# Patient Record
Sex: MALE | Race: WHITE | URBAN - METROPOLITAN AREA
[De-identification: names, ages, dates, MRNs, and addresses within clinical notes are randomized per-mention and may not be internally consistent; named-entity substitution may affect disease eponyms.]

---

## 2018-03-21 ENCOUNTER — OFFICE VISIT (OUTPATIENT)
Dept: FAMILY MEDICINE | Facility: CLINIC | Age: 33
End: 2018-03-21
Payer: COMMERCIAL

## 2018-03-21 VITALS
HEIGHT: 72 IN | BODY MASS INDEX: 25.33 KG/M2 | TEMPERATURE: 99.9 F | OXYGEN SATURATION: 99 % | DIASTOLIC BLOOD PRESSURE: 59 MMHG | HEART RATE: 85 BPM | WEIGHT: 187 LBS | SYSTOLIC BLOOD PRESSURE: 102 MMHG

## 2018-03-21 DIAGNOSIS — J03.90 TONSILLITIS: ICD-10-CM

## 2018-03-21 DIAGNOSIS — R50.9 FEVER IN ADULT: Primary | ICD-10-CM

## 2018-03-21 LAB
FLUAV+FLUBV AG SPEC QL: NEGATIVE
FLUAV+FLUBV AG SPEC QL: NEGATIVE
SPECIMEN SOURCE: NORMAL

## 2018-03-21 PROCEDURE — 87804 INFLUENZA ASSAY W/OPTIC: CPT | Mod: 59 | Performed by: FAMILY MEDICINE

## 2018-03-21 PROCEDURE — 99203 OFFICE O/P NEW LOW 30 MIN: CPT | Performed by: FAMILY MEDICINE

## 2018-03-21 RX ORDER — PREDNISONE 20 MG/1
40 TABLET ORAL DAILY
Qty: 10 TABLET | Refills: 0 | Status: SHIPPED | OUTPATIENT
Start: 2018-03-21 | End: 2018-03-26

## 2018-03-21 RX ORDER — PENICILLIN V POTASSIUM 500 MG/1
500 TABLET, FILM COATED ORAL 2 TIMES DAILY
Qty: 20 TABLET | Refills: 0 | Status: SHIPPED | OUTPATIENT
Start: 2018-03-21 | End: 2018-03-31

## 2018-03-21 NOTE — PATIENT INSTRUCTIONS
Prednisone 40 mg daily for 5 days  Penicillin twice daily for 10 days  Tylenol 500 mg every 8 hours   Follow up within the next 1-2 days if you are experiencing worsening fever, sore throat or difficulty breathing

## 2018-03-21 NOTE — MR AVS SNAPSHOT
"              After Visit Summary   3/21/2018    Carlin Springer    MRN: [id number]           Patient Information     Date Of Birth          1985        Visit Information        Provider Department      3/21/2018 1:20 PM Chuck Casillas MD [facility]        Today's Diagnoses     Fever in adult    -  1      Care Instructions    Prednisone 40 mg daily for 5 days  Penicillin twice daily for 10 days  Tylenol 500 mg every 8 hours   Follow up within the next 1-2 days if you are experiencing worsening fever, sore throat or difficulty breathing           Follow-ups after your visit        Who to contact     If you have questions or need follow up information about today's clinic visit or your schedule please contact [facility] directly at [phone].  Normal or non-critical lab and imaging results will be communicated to you by MyChart, letter or phone within 4 business days after the clinic has received the results. If you do not hear from us within 7 days, please contact the clinic through MyChart or phone. If you have a critical or abnormal lab result, we will notify you by phone as soon as possible.  Submit refill requests through [employer] or call your pharmacy and they will forward the refill request to us. Please allow 3 business days for your refill to be completed.          Additional Information About Your Visit        MyChart Information     [employer] lets you send messages to your doctor, view your test results, renew your prescriptions, schedule appointments and more. To sign up, go to [email or web address] . Click on \"Log in\" on the left side of the screen, which will take you to the Welcome page. Then click on \"Sign up Now\" on the right side of the page.     You will be asked to enter the access code listed below, as well as some personal information. Please follow the directions to create your username and password.     Your access code is: [email or web address]  Expires: " 2018  2:08 PM     Your access code will  in 90 days. If you need help or a new code, please call your [facility] clinic or [phone].        Care EveryWhere ID     This is your Care EveryWhere ID. This could be used by other organizations to access your [facility] medical records  [id number]        Your Vitals Were     Pulse Temperature Height Pulse Oximetry BMI (Body Mass Index)       85 99.9  F (37.7  C) (Tympanic) 6' (1.829 m) 99% 25.36 kg/m2        Blood Pressure from Last 3 Encounters:   18 102/59    Weight from Last 3 Encounters:   18 187 lb (84.8 kg)              We Performed the Following     Influenza A/B antigen        Primary Care Provider Fax #    Physician No Ref-Primary [phone]       No address on file        Equal Access to Services     JUNI RECINOS : Hadii amaury Toro, waaxakira rosaadaha, qaybta kaalmada lupe, clive amaral . So [facility] [phone].    ATENCIÓN: Si habla español, tiene a casillas disposición servicios gratuitos de asistencia lingüística. Lisa al [phone].    We comply with applicable federal civil rights laws and [place] laws. We do not discriminate on the basis of race, color, national origin, age, disability, sex, sexual orientation, or gender identity.            Thank you!     Thank you for choosing [facility]  for your care. Our goal is always to provide you with excellent care. Hearing back from our patients is one way we can continue to improve our services. Please take a few minutes to complete the written survey that you may receive in the mail after your visit with us. Thank you!             Your Updated Medication List - Protect others around you: Learn how to safely use, store and throw away your medicines at www.disposemymeds.org.          This list is accurate as of 3/21/18  2:08 PM.  Always use your most recent med list.                   Brand Name Dispense Instructions for use  Diagnosis    IBUPROFEN PO

## 2018-03-21 NOTE — PROGRESS NOTES
SUBJECTIVE:   Carlin Springer is a 33 year old male who presents to clinic today for the following health issues:      RESPIRATORY SYMPTOMS      Duration:03/19/2018    Description  Sore throat, fever, headache, mild right ear pain, dry cough, PND    Accompanying signs and symptoms: see above     History (predisposing factors):  none    Precipitating or alleviating factors: None    Therapies tried and outcome: cepacol and ibuprofen       No nasal drainage, rhinorrhea, facial pressure,   After taking Ibuprofen, he notices some mild chest pain. Chest pain is correlated to Ibuprofen use. This is first time pt is taking Ibuprofen.   No smoking. No sick contacts.   Last week pt travelled to [place] and [place].   Yesterday he was seen at [facility], negative strep.     Problem list and histories reviewed & adjusted, as indicated.  Additional history: as documented    Labs reviewed in EPIC    Reviewed and updated as needed this visit by clinical staff  Allergies       Reviewed and updated as needed this visit by Provider      Reviewed PMH, PSH, FH, Medication and Allergies.     ROS:  Constitutional, HEENT, cardiovascular, pulmonary, GI, , musculoskeletal, neuro, skin, endocrine and psych systems are negative, except as otherwise noted.    OBJECTIVE:     /59  Pulse 85  Temp 99.9  F (37.7  C) (Tympanic)  Ht 6' (1.829 m)  Wt 187 lb (84.8 kg)  SpO2 99%  BMI 25.36 kg/m2  Body mass index is 25.36 kg/(m^2).  GENERAL: healthy, alert and no distress  EYES: Eyes grossly normal to inspection  HENT: ear canals and TM's normal, + bilateral enlarged tonsils, midline uvula, no abscess seen   NECK: + bilateral cervical adenopathy  RESP: lungs clear to auscultation - no rales, rhonchi or wheezes  CV: regular rate and rhythm, normal S1 S2  MS: no gross musculoskeletal defects noted, no edema    Diagnostic Test Results:  Results for orders placed or performed in visit on 03/21/18   Influenza A/B antigen   Result Value Ref  Range    Influenza A/B Agn Specimen Nasopharyngeal     Influenza A Negative NEG^Negative    Influenza B Negative NEG^Negative       ASSESSMENT/PLAN:       1. Fever in adult  - Influenza A/B antigen    2. Tonsillitis  - discussed symptomatic tx and prednisone s/e   - predniSONE (DELTASONE) 20 MG tablet; Take 2 tablets (40 mg) by mouth daily for 5 days  Dispense: 10 tablet; Refill: 0  - penicillin V potassium (VEETID) 500 MG tablet; Take 1 tablet (500 mg) by mouth 2 times daily for 10 days  Dispense: 20 tablet; Refill: 0  - follow up within the next 2 days if symptoms are not improving       Chuck Casillas MD  [facility]

## 2019-03-14 ENCOUNTER — OFFICE VISIT (OUTPATIENT)
Dept: FAMILY MEDICINE | Facility: CLINIC | Age: 34
End: 2019-03-14
Payer: COMMERCIAL

## 2019-03-14 VITALS
TEMPERATURE: 98.1 F | BODY MASS INDEX: 24.78 KG/M2 | OXYGEN SATURATION: 97 % | RESPIRATION RATE: 25 BRPM | HEIGHT: 71 IN | SYSTOLIC BLOOD PRESSURE: 131 MMHG | WEIGHT: 177 LBS | HEART RATE: 57 BPM | DIASTOLIC BLOOD PRESSURE: 73 MMHG

## 2019-03-14 DIAGNOSIS — R06.83 SNORING: ICD-10-CM

## 2019-03-14 DIAGNOSIS — Z11.4 ENCOUNTER FOR SCREENING FOR HIV: ICD-10-CM

## 2019-03-14 DIAGNOSIS — Z23 NEED FOR DTAP VACCINE: ICD-10-CM

## 2019-03-14 DIAGNOSIS — Z00.00 ROUTINE GENERAL MEDICAL EXAMINATION AT A HEALTH CARE FACILITY: Primary | ICD-10-CM

## 2019-03-14 LAB
BASOPHILS # BLD AUTO: 0 10E9/L (ref 0–0.2)
BASOPHILS NFR BLD AUTO: 0.3 %
DIFFERENTIAL METHOD BLD: NORMAL
EOSINOPHIL # BLD AUTO: 0.2 10E9/L (ref 0–0.7)
EOSINOPHIL NFR BLD AUTO: 2.9 %
ERYTHROCYTE [DISTWIDTH] IN BLOOD BY AUTOMATED COUNT: 13.2 % (ref 10–15)
HCT VFR BLD AUTO: 44.3 % (ref 40–53)
HGB BLD-MCNC: 15.2 G/DL (ref 13.3–17.7)
LYMPHOCYTES # BLD AUTO: 2.5 10E9/L (ref 0.8–5.3)
LYMPHOCYTES NFR BLD AUTO: 40.7 %
MCH RBC QN AUTO: 31 PG (ref 26.5–33)
MCHC RBC AUTO-ENTMCNC: 34.3 G/DL (ref 31.5–36.5)
MCV RBC AUTO: 90 FL (ref 78–100)
MONOCYTES # BLD AUTO: 0.8 10E9/L (ref 0–1.3)
MONOCYTES NFR BLD AUTO: 13.7 %
NEUTROPHILS # BLD AUTO: 2.6 10E9/L (ref 1.6–8.3)
NEUTROPHILS NFR BLD AUTO: 42.4 %
PLATELET # BLD AUTO: 268 10E9/L (ref 150–450)
RBC # BLD AUTO: 4.91 10E12/L (ref 4.4–5.9)
WBC # BLD AUTO: 6.1 10E9/L (ref 4–11)

## 2019-03-14 PROCEDURE — 90471 IMMUNIZATION ADMIN: CPT | Performed by: FAMILY MEDICINE

## 2019-03-14 PROCEDURE — 36415 COLL VENOUS BLD VENIPUNCTURE: CPT | Performed by: FAMILY MEDICINE

## 2019-03-14 PROCEDURE — 99213 OFFICE O/P EST LOW 20 MIN: CPT | Mod: 25 | Performed by: FAMILY MEDICINE

## 2019-03-14 PROCEDURE — 90715 TDAP VACCINE 7 YRS/> IM: CPT | Performed by: FAMILY MEDICINE

## 2019-03-14 PROCEDURE — 84443 ASSAY THYROID STIM HORMONE: CPT | Performed by: FAMILY MEDICINE

## 2019-03-14 PROCEDURE — 99395 PREV VISIT EST AGE 18-39: CPT | Mod: 25 | Performed by: FAMILY MEDICINE

## 2019-03-14 PROCEDURE — 82947 ASSAY GLUCOSE BLOOD QUANT: CPT | Performed by: FAMILY MEDICINE

## 2019-03-14 PROCEDURE — 80061 LIPID PANEL: CPT | Performed by: FAMILY MEDICINE

## 2019-03-14 PROCEDURE — 85025 COMPLETE CBC W/AUTO DIFF WBC: CPT | Performed by: FAMILY MEDICINE

## 2019-03-14 PROCEDURE — 87389 HIV-1 AG W/HIV-1&-2 AB AG IA: CPT | Performed by: FAMILY MEDICINE

## 2019-03-14 ASSESSMENT — ENCOUNTER SYMPTOMS
MYALGIAS: 0
ABDOMINAL PAIN: 0
DIZZINESS: 0
CHILLS: 0
DYSURIA: 0
NERVOUS/ANXIOUS: 0
EYE PAIN: 0
ARTHRALGIAS: 0
SHORTNESS OF BREATH: 0
NAUSEA: 0
HEMATOCHEZIA: 0
PARESTHESIAS: 0
SORE THROAT: 0
FEVER: 0
HEMATURIA: 0
CONSTIPATION: 0
PALPITATIONS: 0
HEADACHES: 0
JOINT SWELLING: 0
HEARTBURN: 0
COUGH: 0
FREQUENCY: 0
DIARRHEA: 0

## 2019-03-14 ASSESSMENT — MIFFLIN-ST. JEOR: SCORE: 1752.06

## 2019-03-14 ASSESSMENT — PATIENT HEALTH QUESTIONNAIRE - PHQ9
SUM OF ALL RESPONSES TO PHQ QUESTIONS 1-9: 24
10. IF YOU CHECKED OFF ANY PROBLEMS, HOW DIFFICULT HAVE THESE PROBLEMS MADE IT FOR YOU TO DO YOUR WORK, TAKE CARE OF THINGS AT HOME, OR GET ALONG WITH OTHER PEOPLE: NOT DIFFICULT AT ALL
SUM OF ALL RESPONSES TO PHQ QUESTIONS 1-9: 24

## 2019-03-14 NOTE — PATIENT INSTRUCTIONS
Preventive Health Recommendations  Male Ages 26 - 39    Yearly exam:             See your health care provider every year in order to  o   Review health changes.   o   Discuss preventive care.    o   Review your medicines if your doctor has prescribed any.    You should be tested each year for STDs (sexually transmitted diseases), if you re at risk.     After age 35, talk to your provider about cholesterol testing. If you are at risk for heart disease, have your cholesterol tested at least every 5 years.     If you are at risk for diabetes, you should have a diabetes test (fasting glucose).  Shots: Get a flu shot each year. Get a tetanus shot every 10 years.     Nutrition:    Eat at least 5 servings of fruits and vegetables daily.     Eat whole-grain bread, whole-wheat pasta and brown rice instead of white grains and rice.     Get adequate Calcium and Vitamin D.     Lifestyle    Exercise for at least 150 minutes a week (30 minutes a day, 5 days a week). This will help you control your weight and prevent disease.     Limit alcohol to one drink per day.     No smoking.     Wear sunscreen to prevent skin cancer.     See your dentist every six months for an exam and cleaning.     Patient Education     Tips to Help Prevent Snoring    Your snoring may get better if you make a few simple changes in your sleeping and waking habits. These changes might be all you need to improve or even cure your snoring, or they may work best when used along with other types of treatment.  Sleep on your side  Sleeping on your side may keep throat tissue from blocking your air passage. This may improve or even cure snoring. But it can be hard to stop sleeping on your back. Try sewing a pocket or sock onto the back of a T-shirt or pajama top. Put a few tennis balls or a bag of unshelled nuts into this pocket or sock, then wear the shirt to bed. This will help keep you from rolling onto your back. If this doesn't work, try wearing a backpack  full of foam pieces. Or put a wedge-shaped pillow behind you. You can also buy devices online that will help you stay off your back during sleep.  Don't use alcohol or certain medicines  Alcohol and medicines such as sedatives and sleeping pills can make breathing slower and more shallow. They also make your muscles relax, so structures in your throat can block your air passage. These changes can cause or worsen snoring. If you snore, don t use alcohol. Talk with your healthcare provider if you take medicines to help you sleep.  Lose weight  Too much weight can make snoring worse. Extra weight puts pressure on your neck tissues and lungs, making breathing harder. If you're overweight, ask your healthcare provider about a weight-loss program.  Exercise regularly  Exercise can help you lose weight, tone your muscles, and make your lungs work better. These changes may help improve your snoring. Ask your healthcare provider about an exercise program like walking, or something else that you enjoy.  Unblock your nose  If something blocks your nose, treating the problem may help improve snoring. Your healthcare provider can suggest medicines for allergies or sinus problems. Nasal saline rinses can also open up the nasal passages. Nasal strips applied on the bridge of the nose can aid breathing. Surgery can straighten a deviated septum. Surgery can also reduce the size of the ridges inside the nose (turbinates) or remove growths (polyps). If you smoke, try to quit. Smoking makes a stuffy nose worse.  Understanding the risks of sleep apnea  In some cases, snoring is not physically harmful. But it can be linked to a more serious condition called sleep apnea. Some common symptoms of sleep apnea include:    Loud, frequent snoring    Heavy daytime drowsiness    Trouble breathing during sleep    Headaches when you wake up  If you are concerned that you might have sleep apnea, talk with your healthcare provider about your  symptoms. Ask about tests and treatments that may help.   Date Last Reviewed: 8/1/2017 2000-2018 [employer]. [address] [place] [address], [place] [address]. All rights reserved. This information is not intended as a substitute for professional medical care. Always follow your healthcare professional's instructions.

## 2019-03-14 NOTE — LETTER
March 28, 2019      Carlin Springer  [address]   [place] [address]        Dear ,    We are writing to inform you of your test results.    Hello!  It was a pleasure to see you in clinic!  Thank you for getting labs done. Everything looks normal, which is good news.     Your hiv test was negative for infection, you do NOT have this disease.     Your cbc, or complete blood count, which measures red blood cells (to check for anemia and other vitamin deficiencies) and white blood cells (to check for infection and leukemia) is normal, which is great!  Your platelets, which reflect liver function and ability to clot, are normal as well.     Congratulations, your cholesterol levels are all great!  Keep up the good work!  I recommend rechecking 5 years or so       Your glucose was normal, which is great, you do not have diabetes.     Your cbc, or complete blood count, which measures red blood cells (to check for anemia and other vitamin deficiencies) and white blood cells (to check for infection and leukemia) is normal, which is great!  Your platelets, which reflect liver function and ability to clot, are normal as well.     Your thyroid function is normal, which is good news.  This means that you do not have hyperthyroidism or hypothyroidism.     If you have any questions, please contact the clinic or schedule an appointment with me, thank you!     Resulted Orders   TSH with free T4 reflex   Result Value Ref Range    TSH 1.28 0.40 - 4.00 mU/L   CBC with platelets differential   Result Value Ref Range    WBC 6.1 4.0 - 11.0 10e9/L    RBC Count 4.91 4.4 - 5.9 10e12/L    Hemoglobin 15.2 13.3 - 17.7 g/dL    Hematocrit 44.3 40.0 - 53.0 %    MCV 90 78 - 100 fl    MCH 31.0 26.5 - 33.0 pg    MCHC 34.3 31.5 - 36.5 g/dL    RDW 13.2 10.0 - 15.0 %    Platelet Count 268 150 - 450 10e9/L    % Neutrophils 42.4 %    % Lymphocytes 40.7 %    % Monocytes 13.7 %    % Eosinophils 2.9 %    % Basophils 0.3 %     Absolute Neutrophil 2.6 1.6 - 8.3 10e9/L    Absolute Lymphocytes 2.5 0.8 - 5.3 10e9/L    Absolute Monocytes 0.8 0.0 - 1.3 10e9/L    Absolute Eosinophils 0.2 0.0 - 0.7 10e9/L    Absolute Basophils 0.0 0.0 - 0.2 10e9/L    Diff Method Automated Method    Lipid panel reflex to direct LDL Fasting   Result Value Ref Range    Cholesterol 177 <200 mg/dL    Triglycerides 43 <150 mg/dL      Comment:      Non Fasting    HDL Cholesterol 71 >39 mg/dL    LDL Cholesterol Calculated 97 <100 mg/dL      Comment:      Desirable:       <100 mg/dl    Non HDL Cholesterol 106 <130 mg/dL   Glucose   Result Value Ref Range    Glucose 88 70 - 99 mg/dL      Comment:      Non Fasting   HIV Antigen Antibody Combo   Result Value Ref Range    HIV Antigen Antibody Combo Nonreactive NR^Nonreactive          Comment:      HIV-1 p24 Ag & HIV-1/HIV-2 Ab Not Detected     If you have any questions or concerns, please call the clinic at the number listed above.       Sincerely,    Jacki Harrell MD/nr

## 2019-03-14 NOTE — NURSING NOTE
Screening Questionnaire for Adult Immunization     Are you sick today?   No    Do you have allergies to medications, food or any vaccine?   No    Have you ever had a serious reaction after receiving a vaccination?   No    Do you have a long-term health problem with heart disease, lung disease,  asthma, kidney disease, diabetes, anemia, metabolic or blood disease?   No    Do you have cancer, leukemia, AIDS, or any immune system problem?   No    Do you take cortisone, prednisone, other steroids, or anticancer drugs, or  have you had any x-ray (radiation) treatments?   No    Have you had a seizure, brain, or other nervous system problem?   No    During the past year, have you received a transfusion of blood or blood       products, or been given a medicine called immune (gamma) globulin?   No    For women: Are you pregnant or is there a chance you could become         pregnant during the next month?   No    Have you received any vaccinations in the past 4 weeks?   No     Immunization questionnaire answers were all negative.      MNVFC doesn't apply on this patient      Per orders of Dr. Harrell, injection of tdap given by Jah Khanna. Patient instructed to remain in clinic for 20 minutes afterwards, and to report any adverse reaction to me immediately.    Prior to injection verified patient identity using patient's name and date of birth.         Screening performed by Jah Khanna, CMA

## 2019-03-14 NOTE — PROGRESS NOTES
SUBJECTIVE:   CC: Carlin Springer is an 34 year old male who presents for preventative health visit.     Snoring  He reports many years of snoring not associated with morning headaches or daytime grogginess. He reports sleeping 6 hours a night, goes to bed at 10 and wakes at 4 am, has been doing this for year in order to get in a morning work out before work. He denies apneic episode. He reports prior hx of overbite corrected with orthodonture work. He'd like to be seen at [facility].    Physical   Annual:     Getting at least 3 servings of Calcium per day:  NO    Bi-annual eye exam:  NO    Dental care twice a year:  Yes    Sleep apnea or symptoms of sleep apnea:  Excessive snoring    Diet:  Low fat/cholesterol and Breakfast skipped    Frequency of exercise:  6-7 days/week    Duration of exercise:  Greater than 60 minutes    Taking medications regularly:  Yes    Medication side effects:  None    Additional concerns today:  No    PHQ-2 Total Score: 4      PHQ-9 score:    PHQ-9 SCORE 3/14/2019   PHQ-9 Total Score MyChart 24 (Severe depression)   PHQ-9 Total Score 0     Today's PHQ-2 Score:   PHQ-2 ( 1999 Pf[employer]) 3/14/2019   Q1: Little interest or pleasure in doing things 0   Q2: Feeling down, depressed or hopeless 0   PHQ-2 Score 0   Q1: Little interest or pleasure in doing things More than half the days   Q2: Feeling down, depressed or hopeless More than half the days   PHQ-2 Score 4       Abuse: Current or Past(Physical, Sexual or Emotional)- No  Do you feel safe in your environment? Yes    Social History     Tobacco Use     Smoking status: Never Smoker     Smokeless tobacco: Never Used   Substance Use Topics     Alcohol use: Yes     Comment: socially     Alcohol Use 3/14/2019   If you drink alcohol do you typically have greater than 3 drinks per day OR greater than 7 drinks per week? No   No flowsheet data found.    Last PSA: No results found for: PSA    Reviewed orders with patient. Reviewed health  maintenance and updated orders accordingly - Yes  BP Readings from Last 3 Encounters:   03/14/19 131/73   03/21/18 102/59    Wt Readings from Last 3 Encounters:   03/14/19 80.3 kg (177 lb)   03/21/18 84.8 kg (187 lb)                  There is no problem list on file for this patient.    Past Surgical History:   Procedure Laterality Date     NO HISTORY OF SURGERY         Social History     Tobacco Use     Smoking status: Never Smoker     Smokeless tobacco: Never Used   Substance Use Topics     Alcohol use: Yes     Comment: socially     Family History   Problem Relation Age of Onset     No Known Problems Mother      No Known Problems Father          Current Outpatient Medications   Medication Sig Dispense Refill     IBUPROFEN PO        No Known Allergies  No lab results found.     Reviewed and updated as needed this visit by clinical staff  Tobacco  Allergies  Meds  Med Hx  Surg Hx  Fam Hx  Soc Hx        Reviewed and updated as needed this visit by Provider  [facility] Hx        Past Medical History:   Diagnosis Date     NO ACTIVE PROBLEMS       Past Surgical History:   Procedure Laterality Date     NO HISTORY OF SURGERY         Review of Systems   Constitutional: Negative for chills and fever.   HENT: Negative for congestion, ear pain, hearing loss and sore throat.    Eyes: Negative for pain and visual disturbance.   Respiratory: Negative for cough and shortness of breath.    Cardiovascular: Negative for chest pain, palpitations and peripheral edema.   Gastrointestinal: Negative for abdominal pain, constipation, diarrhea, heartburn, hematochezia and nausea.   Genitourinary: Negative for discharge, dysuria, frequency, hematuria, impotence and urgency.   Musculoskeletal: Negative for arthralgias, joint swelling and myalgias.   Skin: Negative for rash.   Neurological: Negative for dizziness, headaches and paresthesias.   Psychiatric/Behavioral: Negative for mood changes. The patient is not nervous/anxious.   "    CONSTITUTIONAL: NEGATIVE for fever, chills, change in weight  INTEGUMENTARY/SKIN: NEGATIVE for worrisome rashes, moles or lesions  EYES: NEGATIVE for vision changes or irritation  ENT: NEGATIVE for ear, mouth and throat problems  RESP: NEGATIVE for significant cough or SOB  CV: NEGATIVE for chest pain, palpitations or peripheral edema  GI: NEGATIVE for nausea, abdominal pain, heartburn, or change in bowel habits   male: negative for dysuria, hematuria, decreased urinary stream, erectile dysfunction, urethral discharge  MUSCULOSKELETAL: NEGATIVE for significant arthralgias or myalgia  NEURO: NEGATIVE for weakness, dizziness or paresthesias  PSYCHIATRIC: NEGATIVE for changes in mood or affect    OBJECTIVE:   /73 (BP Location: Right arm, Patient Position: Sitting, Cuff Size: Adult Regular)   Pulse 57   Temp 98.1  F (36.7  C) (Oral)   Resp 25   Ht 1.791 m (5' 10.5\")   Wt 80.3 kg (177 lb)   SpO2 97%   BMI 25.04 kg/m      Physical Exam  GENERAL: healthy, alert and no distress  EYES: Eyes grossly normal to inspection, PERRL and conjunctivae and sclerae normal  HENT: ear canals and TM's normal, nose and mouth without ulcers or lesions  NECK: no adenopathy, no asymmetry, masses, or scars and thyroid normal to palpation  RESP: lungs clear to auscultation - no rales, rhonchi or wheezes  CV: regular rate and rhythm, normal S1 S2, no S3 or S4, no murmur, click or rub, no peripheral edema and peripheral pulses strong  ABDOMEN: soft, nontender, no hepatosplenomegaly, no masses and bowel sounds normal  MS: no gross musculoskeletal defects noted, no edema  SKIN: no suspicious lesions or rashes  NEURO: Normal strength and tone, mentation intact and speech normal  PSYCH: mentation appears normal, affect normal/bright      ASSESSMENT/PLAN:   1. Routine general medical examination at a health care facility  routine  - Lipid panel reflex to direct LDL Fasting  - Glucose    2. Snoring  New problem, he'd like evaluation " "at [place], referral done  - SLEEP EVALUATION & MANAGEMENT REFERRAL - ADULT -Other (Respond in commments) ([place]); Future  - TSH with free T4 reflex  - CBC with platelets differential    3. Need for DTaP vaccine  Done today  - TDAP, IM (10 - 64 YRS) - Adacel    4. Encounter for screening for HIV  Routine one time screen  Will fu as indicated.   - HIV Antigen Antibody Combo      Note that initiall phq2 and 9 were very high but this seems to be entering error, on reassessment Carlin denies any signs of depression    COUNSELING:   Reviewed preventive health counseling, as reflected in patient instructions    BP Readings from Last 1 Encounters:   03/14/19 131/73     Estimated body mass index is 25.04 kg/m  as calculated from the following:    Height as of this encounter: 1.791 m (5' 10.5\").    Weight as of this encounter: 80.3 kg (177 lb).     reports that  has never smoked. he has never used smokeless tobacco.    Counseling Resources:  ATP IV Guidelines  Pooled Cohorts Equation Calculator  FRAX Risk Assessment  ICSI Preventive Guidelines  Dietary Guidelines for Americans, 2010  USDA's MyPlate  ASA Prophylaxis  Lung CA Screening    Jacki Harrell MD  [facility]  Answers for HPI/ROS submitted by the patient on 3/14/2019   Annual Exam:  If you checked off any problems, how difficult have these problems made it for you to do your work, take care of things at home, or get along with other people?: Not difficult at all  PHQ9 TOTAL SCORE: 24    "

## 2019-03-15 LAB
CHOLEST SERPL-MCNC: 177 MG/DL
GLUCOSE SERPL-MCNC: 88 MG/DL (ref 70–99)
HDLC SERPL-MCNC: 71 MG/DL
HIV 1+2 AB+HIV1 P24 AG SERPL QL IA: NONREACTIVE
LDLC SERPL CALC-MCNC: 97 MG/DL
NONHDLC SERPL-MCNC: 106 MG/DL
TRIGL SERPL-MCNC: 43 MG/DL
TSH SERPL DL<=0.005 MIU/L-ACNC: 1.28 MU/L (ref 0.4–4)

## 2019-03-28 NOTE — RESULT ENCOUNTER NOTE
Hello!  It was a pleasure to see you in clinic!  Thank you for getting labs done. Everything looks normal, which is good news.     Your hiv test was negative for infection, you do NOT have this disease.     Your cbc, or complete blood count, which measures red blood cells (to check for anemia and other vitamin deficiencies) and white blood cells (to check for infection and leukemia) is normal, which is great!  Your platelets, which reflect liver function and ability to clot, are normal as well.     Congratulations, your cholesterol levels are all great!  Keep up the good work!  I recommend rechecking 5 years or so    Your glucose was normal, which is great, you do not have diabetes.     Your cbc, or complete blood count, which measures red blood cells (to check for anemia and other vitamin deficiencies) and white blood cells (to check for infection and leukemia) is normal, which is great!  Your platelets, which reflect liver function and ability to clot, are normal as well.     Your thyroid function is normal, which is good news.  This means that you do not have hyperthyroidism or hypothyroidism.     If you have any questions, please contact the clinic or schedule an appointment with me, thank you!    Sincerely,  Dr. Jacki Harrell MD  3/27/2019

## 2024-10-27 ASSESSMENT — PATIENT HEALTH QUESTIONNAIRE - PHQ9: SUM OF ALL RESPONSES TO PHQ QUESTIONS 1-9: 24
